# Patient Record
Sex: FEMALE | Race: WHITE
[De-identification: names, ages, dates, MRNs, and addresses within clinical notes are randomized per-mention and may not be internally consistent; named-entity substitution may affect disease eponyms.]

---

## 2023-10-30 ENCOUNTER — HOSPITAL ENCOUNTER (INPATIENT)
Dept: HOSPITAL 19 - COL.ER | Age: 28
LOS: 1 days | Discharge: HOME | DRG: 897 | End: 2023-10-31
Attending: INTERNAL MEDICINE | Admitting: INTERNAL MEDICINE
Payer: SELF-PAY

## 2023-10-30 VITALS — OXYGEN SATURATION: 98 %

## 2023-10-30 VITALS — OXYGEN SATURATION: 76 %

## 2023-10-30 VITALS — OXYGEN SATURATION: 97 %

## 2023-10-30 VITALS — OXYGEN SATURATION: 100 %

## 2023-10-30 VITALS — OXYGEN SATURATION: 99 %

## 2023-10-30 VITALS
TEMPERATURE: 97.6 F | SYSTOLIC BLOOD PRESSURE: 120 MMHG | HEART RATE: 102 BPM | OXYGEN SATURATION: 97 % | DIASTOLIC BLOOD PRESSURE: 93 MMHG

## 2023-10-30 VITALS — HEART RATE: 102 BPM | DIASTOLIC BLOOD PRESSURE: 90 MMHG | OXYGEN SATURATION: 97 % | SYSTOLIC BLOOD PRESSURE: 128 MMHG

## 2023-10-30 VITALS
OXYGEN SATURATION: 100 % | DIASTOLIC BLOOD PRESSURE: 100 MMHG | HEART RATE: 85 BPM | TEMPERATURE: 98.5 F | SYSTOLIC BLOOD PRESSURE: 138 MMHG

## 2023-10-30 VITALS — OXYGEN SATURATION: 94 %

## 2023-10-30 VITALS — OXYGEN SATURATION: 95 %

## 2023-10-30 VITALS — OXYGEN SATURATION: 96 %

## 2023-10-30 VITALS — OXYGEN SATURATION: 80 %

## 2023-10-30 VITALS — OXYGEN SATURATION: 92 %

## 2023-10-30 VITALS — SYSTOLIC BLOOD PRESSURE: 122 MMHG | DIASTOLIC BLOOD PRESSURE: 89 MMHG | HEART RATE: 104 BPM | OXYGEN SATURATION: 97 %

## 2023-10-30 VITALS — OXYGEN SATURATION: 86 %

## 2023-10-30 VITALS — OXYGEN SATURATION: 88 %

## 2023-10-30 VITALS — OXYGEN SATURATION: 78 %

## 2023-10-30 VITALS — WEIGHT: 184.31 LBS | HEIGHT: 59.02 IN | BODY MASS INDEX: 37.16 KG/M2

## 2023-10-30 VITALS — OXYGEN SATURATION: 81 %

## 2023-10-30 VITALS — OXYGEN SATURATION: 93 %

## 2023-10-30 VITALS — OXYGEN SATURATION: 84 %

## 2023-10-30 VITALS — OXYGEN SATURATION: 85 %

## 2023-10-30 VITALS — OXYGEN SATURATION: 82 %

## 2023-10-30 VITALS — SYSTOLIC BLOOD PRESSURE: 135 MMHG | HEART RATE: 81 BPM | OXYGEN SATURATION: 98 % | DIASTOLIC BLOOD PRESSURE: 97 MMHG

## 2023-10-30 VITALS — OXYGEN SATURATION: 83 %

## 2023-10-30 VITALS — OXYGEN SATURATION: 89 %

## 2023-10-30 VITALS — OXYGEN SATURATION: 91 %

## 2023-10-30 VITALS — OXYGEN SATURATION: 90 %

## 2023-10-30 VITALS — OXYGEN SATURATION: 73 %

## 2023-10-30 DIAGNOSIS — Z90.49: ICD-10-CM

## 2023-10-30 DIAGNOSIS — E87.6: ICD-10-CM

## 2023-10-30 DIAGNOSIS — Z88.8: ICD-10-CM

## 2023-10-30 DIAGNOSIS — E03.9: ICD-10-CM

## 2023-10-30 DIAGNOSIS — F10.131: Primary | ICD-10-CM

## 2023-10-30 DIAGNOSIS — D68.51: ICD-10-CM

## 2023-10-30 DIAGNOSIS — Z79.890: ICD-10-CM

## 2023-10-30 LAB
ALBUMIN SERPL-MCNC: 4.6 GM/DL (ref 3.5–5)
ALP SERPL-CCNC: 115 U/L (ref 40–150)
ALT SERPL-CCNC: 21 U/L (ref 0–55)
ANION GAP SERPL CALC-SCNC: 17 MMOL/L (ref 7–16)
AST SERPL-CCNC: 29 U/L (ref 5–34)
BASOPHILS # BLD: 0.1 K/MM3 (ref 0–0.2)
BASOPHILS NFR BLD AUTO: 0.8 % (ref 0–2)
BILIRUB SERPL-MCNC: 1.4 MG/DL (ref 0.2–1.2)
BUN SERPL-MCNC: 9 MG/DL (ref 7–19)
CALCIUM SERPL-MCNC: 9.4 MG/DL (ref 8.4–10.2)
CHLORIDE SERPL-SCNC: 100 MMOL/L (ref 98–107)
CO2 SERPL-SCNC: 20 MMOL/L (ref 22–29)
CREAT SERPL-SCNC: 0.93 MG/DL (ref 0.57–1.11)
EOSINOPHIL # BLD: 0.4 K/MM3 (ref 0–0.7)
EOSINOPHIL NFR BLD: 3.7 % (ref 0–4)
ERYTHROCYTE [DISTWIDTH] IN BLOOD BY AUTOMATED COUNT: 13 % (ref 11.5–14.5)
ETHANOL SPEC-SCNC: < 10 MG/DL (ref 0–10)
GLUCOSE SERPL-MCNC: 106 MG/DL (ref 70–99)
GRANULOCYTES # BLD AUTO: 65 % (ref 42.2–75.2)
HCG SERPL QL: NEGATIVE
HCT VFR BLD AUTO: 46.1 % (ref 37–47)
HGB BLD-MCNC: 15.3 G/DL (ref 12.5–16)
INR BLD: 1.1 (ref 0.8–3)
KETONES UR STRIP.AUTO-MCNC: (no result) MG/DL
LIPASE SERPL-CCNC: 17 U/L (ref 8–78)
LYMPHOCYTES # BLD: 2.7 K/MM3 (ref 1.2–3.4)
LYMPHOCYTES NFR BLD: 23.2 % (ref 20–51)
MCH RBC QN AUTO: 28 PG (ref 27–31)
MCHC RBC AUTO-ENTMCNC: 33 G/DL (ref 33–37)
MCV RBC AUTO: 85 FL (ref 80–100)
MONOCYTES # BLD: 0.8 K/MM3 (ref 0.1–0.6)
MONOCYTES NFR BLD AUTO: 6.9 % (ref 1.7–9.3)
NEUTROPHILS # BLD: 7.7 K/MM3 (ref 1.4–6.5)
PH UR STRIP.AUTO: 6 [PH] (ref 5–8.5)
PLATELET # BLD AUTO: 470 K/MM3 (ref 130–400)
PMV BLD AUTO: 9 FL (ref 7.4–10.4)
POTASSIUM SERPL-SCNC: 3.4 MMOL/L (ref 3.5–4.5)
PROT SERPL-MCNC: 8.4 GM/DL (ref 6.2–8.1)
PROTHROMBIN TIME: 11.8 SECONDS (ref 9.7–12.8)
RBC # BLD AUTO: 5.43 M/MM3 (ref 4.1–5.3)
RBC # UR STRIP.AUTO: (no result) /UL
RBC # UR: (no result) /HPF (ref 0–2)
SODIUM SERPL-SCNC: 137 MMOL/L (ref 136–145)
SP GR UR STRIP.AUTO: 1.02 (ref 1–1.03)
SQUAMOUS # URNS: (no result) /HPF (ref 0–10)
UA DIPSTICK PNL UR STRIP.AUTO: (no result)
URN COLLECT METHOD CLASS: (no result)
UROBILINOGEN UR STRIP.AUTO-MCNC: 0.2 E.U/DL (ref 0.2–1)

## 2023-10-30 NOTE — NUR
PT WAS ADMITTED FOR ETOH DETOX. PT IS ALERT AND ORIENTED. STEP-MOM AT BEDSIDE.
PT REFUSED LUNCH. FAMILY BROUGHT HER [place] FOR SUPPER. REMAINS STABLE
ON ROUNDS. NO SIGN OF DISTRESS AT THIS TIME.

## 2023-10-31 VITALS — TEMPERATURE: 98.6 F | SYSTOLIC BLOOD PRESSURE: 142 MMHG | DIASTOLIC BLOOD PRESSURE: 131 MMHG | HEART RATE: 86 BPM

## 2023-10-31 VITALS — HEART RATE: 105 BPM | SYSTOLIC BLOOD PRESSURE: 122 MMHG | DIASTOLIC BLOOD PRESSURE: 96 MMHG

## 2023-10-31 VITALS — OXYGEN SATURATION: 96 %

## 2023-10-31 VITALS — OXYGEN SATURATION: 80 %

## 2023-10-31 VITALS — OXYGEN SATURATION: 100 %

## 2023-10-31 VITALS — OXYGEN SATURATION: 99 %

## 2023-10-31 VITALS — SYSTOLIC BLOOD PRESSURE: 114 MMHG | TEMPERATURE: 98 F | DIASTOLIC BLOOD PRESSURE: 77 MMHG

## 2023-10-31 VITALS — OXYGEN SATURATION: 97 %

## 2023-10-31 VITALS — OXYGEN SATURATION: 98 %

## 2023-10-31 VITALS — OXYGEN SATURATION: 82 %

## 2023-10-31 VITALS — OXYGEN SATURATION: 92 %

## 2023-10-31 VITALS — SYSTOLIC BLOOD PRESSURE: 107 MMHG | TEMPERATURE: 98 F | DIASTOLIC BLOOD PRESSURE: 69 MMHG | HEART RATE: 81 BPM

## 2023-10-31 VITALS — OXYGEN SATURATION: 91 %

## 2023-10-31 VITALS — DIASTOLIC BLOOD PRESSURE: 108 MMHG | OXYGEN SATURATION: 96 % | HEART RATE: 82 BPM | SYSTOLIC BLOOD PRESSURE: 141 MMHG

## 2023-10-31 VITALS — OXYGEN SATURATION: 95 %

## 2023-10-31 VITALS — OXYGEN SATURATION: 94 %

## 2023-10-31 VITALS — OXYGEN SATURATION: 69 %

## 2023-10-31 VITALS — OXYGEN SATURATION: 88 %

## 2023-10-31 VITALS — OXYGEN SATURATION: 83 %

## 2023-10-31 VITALS — OXYGEN SATURATION: 93 %

## 2023-10-31 VITALS — DIASTOLIC BLOOD PRESSURE: 81 MMHG | SYSTOLIC BLOOD PRESSURE: 113 MMHG | HEART RATE: 74 BPM

## 2023-10-31 VITALS — OXYGEN SATURATION: 89 %

## 2023-10-31 VITALS — SYSTOLIC BLOOD PRESSURE: 144 MMHG | HEART RATE: 92 BPM | TEMPERATURE: 97.7 F | DIASTOLIC BLOOD PRESSURE: 111 MMHG

## 2023-10-31 LAB
ANION GAP SERPL CALC-SCNC: 9 MMOL/L (ref 7–16)
BASOPHILS # BLD: 0.1 K/MM3 (ref 0–0.2)
BASOPHILS NFR BLD AUTO: 1 % (ref 0–2)
BUN SERPL-MCNC: 5 MG/DL (ref 7–19)
CALCIUM SERPL-MCNC: 8.5 MG/DL (ref 8.4–10.2)
CHLORIDE SERPL-SCNC: 108 MMOL/L (ref 98–107)
CO2 SERPL-SCNC: 21 MMOL/L (ref 22–29)
CREAT SERPL-SCNC: 0.83 MG/DL (ref 0.57–1.11)
EOSINOPHIL # BLD: 0.6 K/MM3 (ref 0–0.7)
EOSINOPHIL NFR BLD: 7.5 % (ref 0–4)
ERYTHROCYTE [DISTWIDTH] IN BLOOD BY AUTOMATED COUNT: 13 % (ref 11.5–14.5)
GLUCOSE SERPL-MCNC: 99 MG/DL (ref 70–99)
GRANULOCYTES # BLD AUTO: 49.3 % (ref 42.2–75.2)
HCT VFR BLD AUTO: 38.2 % (ref 37–47)
HGB BLD-MCNC: 12.6 G/DL (ref 12.5–16)
LYMPHOCYTES # BLD: 2.7 K/MM3 (ref 1.2–3.4)
LYMPHOCYTES NFR BLD: 33.5 % (ref 20–51)
MAGNESIUM SERPL-MCNC: 2 MG/DL (ref 1.6–2.6)
MCH RBC QN AUTO: 28 PG (ref 27–31)
MCHC RBC AUTO-ENTMCNC: 33 G/DL (ref 33–37)
MCV RBC AUTO: 85 FL (ref 80–100)
MONOCYTES # BLD: 0.7 K/MM3 (ref 0.1–0.6)
MONOCYTES NFR BLD AUTO: 8.5 % (ref 1.7–9.3)
NEUTROPHILS # BLD: 4 K/MM3 (ref 1.4–6.5)
PLATELET # BLD AUTO: 314 K/MM3 (ref 130–400)
PMV BLD AUTO: 8.8 FL (ref 7.4–10.4)
POTASSIUM SERPL-SCNC: 4.7 MMOL/L (ref 3.5–4.5)
RBC # BLD AUTO: 4.52 M/MM3 (ref 4.1–5.3)
SODIUM SERPL-SCNC: 138 MMOL/L (ref 136–145)

## 2023-10-31 NOTE — NUR
Initial visit; Patient thanked  for coming in to see her. 
visited briefly and will keep Jeniffer in her prayers.

## 2023-10-31 NOTE — NUR
KATIE reviewed pt's clinical record this morning and noted she was admitted to
the ICU for delirium tremors/withdrawals secondary to alcohol use d/o on
10/30/2023. KATIE met with pt @ the bedside this morning after rounds, and she
was pleasant, oriented to person, time, place and situation, but she avoided
eye contact with this writer the entire interview. Pt reports she began
drinking 1.5 years ago, after her divorce, angry that her ex-spouse won
custody  of her 2 sons, ages 3 and 6. Pt stated she was unemployed and
suicidal, and not capable of caring for her children. She indicated she
had threatened to shoot herself, but told her father and he confiscated
her gun. Pt has denied SI since that time reporting at times she wishes
she was dead. When asked if she was experiencing SI prior to her admission, pt
stated, "no." LCSW asked pt how she was feeling today, she denied having any
suicidal thoughts. She states she drinks the alcohol to "numb her pain," but
is not suicidal.
Pt admits to drinking 1/2 gallon of vokda a day and she drives while
intoxicated. She never been stopped by police and is aware of the
 life-threatening and legal implications of her reckless behaviors and
knows she should stop drinking. Pt reports pds of blackouts and has
experienced seizures/withdrawals in the past, approx. a year ago. She also
shared that her mother and paternal grandparents suffered from alcoholism. Pt,
who also vapes occ, shared she had a therapist, but could not remember her
name, and stated she has not seen her in 5 months. When asked if she was
 interested in alcohol treatment, pt stated she didn't know. However, she
 accepted the screening/assessment number offered by [facility], ph# [phone]. Pt is currently unemployed with
no health insurance, and resides with her parents on a farm in [place], [place].
She has no primary care provider, but fills prescriptions, if needed at the
 [facility]. [facility] gave pt a list of primary care
providers to review and select after she secures insurance. When asked if she
had an advance directive-HCPOA, pt stated, "no." [facility] offered paperwork, but
pt declined. No other concerns noted.

## 2023-10-31 NOTE — NUR
PT REMAINS STABLE ON ROUNDS. RESPIRATIONS EVEN AND UNLABORED. NO SIGN OF
DISTRESS. PT DISCHARGED TO HOME.